# Patient Record
(demographics unavailable — no encounter records)

---

## 2024-10-17 NOTE — DISCUSSION/SUMMARY
[de-identified] : Right shoulder follow-up  HPI Patient is a 43-year-old male reports to office for subsequent evaluation of his right shoulder pain.  He underwent a right shoulder arthroscopy/rotator cuff repair and open biceps tenodesis done on 5/29/2024 by Dr. Feliz.  Admits to shoulder stiffness as he has not done physical therapy in several weeks.  Lifting, certain range of motion and palpating certain areas aggravate the patient's pain at times. Denies any numbness or tingling.  Right shoulder exam is as follows: No swelling noted. No erythema or ecchymosis. Well-healed incision sites. Active forward flexion to approximately 150 degrees, passive forward flexion to approximate 170 degrees with mild stiffness and pain. Active abduction to approximately 120 degrees, passive abduction to approximately 150 degrees with mild stiffness and pain. Internal rotation to L5 and external rotation to 90 degrees. Strength is 4/5. Light touch intact throughout.  Assessment/plan Patient's pain has been improving.  He is mildly stiff on exam with range of motion.  New PT prescription provided so he may restart that as directed.  The shoulder conditioning program from the [employer] was given to the patient so they may try that at home.  He has already returned back to work.  Follow-up on as-needed basis.  May consider injections in future if still symptomatic.  All questions/concerns were answered in detail.